# Patient Record
(demographics unavailable — no encounter records)

---

## 2024-10-28 NOTE — HISTORY OF PRESENT ILLNESS
[FreeTextEntry1] : DM2 diagnosed about 10 years ago, uncomplicated. Taking metformin 1000 mg twice daily, Ozempic 0.5 mg weekly. Compliance: Good Tolerating: Yes Therapies tried: Metformin, insulin Last HgA1c in July 2024 was 12.9%. Checks BG a least twice a day. Reports BG always less than 130 mg/dL, any time of day. Hypoglycemia: Has awareness. No frequent or sever episodes.  Polyuria, polydipsia, unintentional weight loss: Denies   Diabetic emergencies: DKA 2024. Patient was initially seen by me at [facility] on 7/15/24 for consult of hyperglycemia. He was discharged on 7/15/24.   Microvascular complications Last albumin/creatinine ratio: unknown Neuropathy symptoms: Denies Microfilament exam: normal; July 2024 Retinopathy: Denies Last eye exam: Annually   Macrovascular complications CAD/CVA/PVD: Denies   HTN: Yes, regimen includes lisinopril 20 mg daily. LDL: Unknown. On simvastatin 20 mg daily. Goal is < 70 mg/dL.   Diet: Carb-restricted Exercise: Play soccer, walks. Immunizations: Pneumonia vaccine recommended. Family DM History: Denies

## 2024-10-28 NOTE — PHYSICAL EXAM
[Alert] : alert [Well Nourished] : well nourished [Healthy Appearance] : healthy appearance [No Acute Distress] : no acute distress [Well Developed] : well developed [Normal Voice/Communication] : normal voice communication [Normal Sclera/Conjunctiva] : normal sclera/conjunctiva [EOMI] : extra ocular movement intact [Normal Hearing] : hearing was normal [No Neck Mass] : no neck mass was observed [No Respiratory Distress] : no respiratory distress [Normal Rate and Effort] : normal respiratory rate and effort [Normal Rate] : heart rate was normal [Not Distended] : not distended [Spine Straight] : spine straight [No Stigmata of Cushings Syndrome] : no stigmata of Cushings Syndrome [Normal Gait] : normal gait [No Involuntary Movements] : no involuntary movements were seen [Normal Strength/Tone] : muscle strength and tone were normal [No Rash] : no rash [Abdominal Striae] : no abdominal striae [Acanthosis Nigricans] : no acanthosis nigricans [Foot Ulcers] : no foot ulcers [Oriented x3] : oriented to person, place, and time [Normal Affect] : the affect was normal [Normal Insight/Judgement] : insight and judgment were intact [Normal Mood] : the mood was normal [Remote Memory Normal] : remote memory was not impaired [de-identified] : No observed focal neurological deficits.

## 2024-10-28 NOTE — REASON FOR VISIT
[Follow - Up] : a follow-up visit [DM Type 2] : DM Type 2 [Source: ______] : History obtained from CARLOS MANUEL